# Patient Record
Sex: FEMALE | Race: WHITE
[De-identification: names, ages, dates, MRNs, and addresses within clinical notes are randomized per-mention and may not be internally consistent; named-entity substitution may affect disease eponyms.]

---

## 2020-06-17 ENCOUNTER — HOSPITAL ENCOUNTER (EMERGENCY)
Dept: HOSPITAL 88 - ER | Age: 80
Discharge: HOME | End: 2020-06-17
Payer: MEDICARE

## 2020-06-17 VITALS — HEIGHT: 64 IN | WEIGHT: 192 LBS | BODY MASS INDEX: 32.78 KG/M2

## 2020-06-17 DIAGNOSIS — S52.612A: ICD-10-CM

## 2020-06-17 DIAGNOSIS — I10: ICD-10-CM

## 2020-06-17 DIAGNOSIS — Z85.3: ICD-10-CM
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
## 2020-06-17 DIAGNOSIS — E11.9: ICD-10-CM

## 2020-06-17 DIAGNOSIS — I48.91: ICD-10-CM

## 2020-06-17 DIAGNOSIS — S52.502A: Primary | ICD-10-CM

## 2020-06-17 PROCEDURE — 99282 EMERGENCY DEPT VISIT SF MDM: CPT

## 2020-06-17 NOTE — EMERGENCY DEPARTMENT NOTE
History of Present Illnes


History of Present Illness


Chief Complaint:  Extremity Trauma/Pain


History of Present Illness


This is a 80 year old  female HERE FOR TRIP AND FALL LAST NIGHT ~11PM. 

FELL ONTO HER LEFT ARM. CLIENT REPORTS PAIN 8/10. DENIES DIZZINESS, NAUSEA, 

VOMITING, DIARRHEA, FEVER, COUGH, SOB.


Historian:  Patient


Arrival Mode:  Car


 Required:  No


Onset (how long ago):  hour(s)


Location:  LEFT WRIST


Quality:  MILD PAIN


Radiation:  Reports non-radiation


Severity:  mild


Onset quality:  sudden


Timing of current episode:  constant


Progression:  unchanged


Chronicity:  new


Context:  Denies recent illness


Relieving factors:  none


Exacerbating factors:  none


Associated symptoms:  Reports denies other symptoms, Reports other (NO LOC, NO 

OTHER INJURY)


Treatments prior to arrival:  none





Past Medical/Family History


Physician Review


I have reviewed the patient's past medical and family history.  Any updates have

been documented here.





Past Medical History


Recent Fever:  No


Clinical Suspicion of Infectio:  No


New/Unexplained Change in Ment:  No


Past Medical History:  Hypertension, Diabetes, A-Fib, Cancer, Osteoarthritis


Other Medical History:  


BREATS CANCER





OSTEOPOROSIS


Past Surgical History:  Back Surgery


Other Surgery:  


BACK SURGERY





LUMPECTOMY





Social History


Smoking Cessation:  Never Smoker


Counseling Performed:  No


Alcohol Use:  None


Any Illegal Drug Use:  No


TB Exposure/Symptoms:  No


Physically hurt or threatened:  No





Family History


Family history of heart diseas:  Yes





Other


Any Pre-Existing Lines (PICC,:  No





Review of Systems


Review of Systems


Constitutional:  Reports no symptoms


EENTM:  Reports no symptoms


Cardiovascular:  Reports no symptoms


Respiratory:  Reports no symptoms


Gastrointestinal:  Reports no symptoms


Genitourinary:  Reports no symptoms


Musculoskeletal:  Reports as per HPI, Reports joint pain, Reports joint swelling


Integumentary:  Reports no symptoms


Neurological:  Reports no symptoms


Psychological:  Reports no symptoms


Endocrine:  Reports no symptoms


Hematological/Lymphatic:  Reports no symptoms





Physical Exam


Related Data


Allergies:  


Coded Allergies:  


     iodine (Verified  Allergy, Severe, 7/16/17)


Triage Vital Signs





Vital Signs








  Date Time  Temp Pulse Resp B/P (MAP) Pulse Ox O2 Delivery O2 Flow Rate FiO2


 


6/17/20 09:04 97.6 74 16 104/58 98   








Vital signs reviewed:  Yes





Physical Exam


CONSTITUTIONAL





Constitutional:  Present well-developed, Present well-nourished


HENT


HENT:  Present normocephalic, Present atraumatic, Present oropharynx clear/moist

, Present nose normal


HENT L/R:  Present left ext ear normal, Present right ext ear normal


EYES





Eyes:  Reports PERRL, Reports conjunctivae normal


NECK


Neck:  Present ROM normal


PULMONARY


Pulmonary:  Present effort normal, Present breath sounds normal


CARDIOVASCULAR





Cardiovascular:  Present regular rhythm, Present heart sounds normal, Present 

capillary refill normal, Present normal rate


GASTROINTESTINAL





Abdominal:  Present soft, Present nontender, Present bowel sounds normal


GENITOURINARY





Genitourinary:  Present exam deferred


SKIN


Skin:  Present warm, Present dry, Present other (SMALL 1.5 CM ABRASION TO LEFT 

KNEE, SKIN TEAR TO LEFT FOREARM)


MUSCULOSKELETAL





Musculoskeletal:  Present deformity (LEFT WRIST), Present tenderness (MILD LEFT 

WRIST), Present swelling (LEFT WRIST)


NEUROLOGICAL





Neurological:  Present alert, Present oriented x 3, Present no gross motor or 

sensory deficits


PSYCHOLOGICAL


Psychological:  Present mood/affect normal, Present judgement normal





Results


Imaging


Imaging results reviewed:  Yes


Impressions


Procedure: [id number] DX/WRIST COMPLETE LEFT


Exam Date:                             Exam Time: 








                              REPORT STATUS: Signed





TECHNIQUE: 4 views of the left wrist





HISTORY: 


^fall.





COMPARISON: None.





IMPRESSION:


Acute fracture of the distal radius with approximately 1 cm of fracture


fragment impaction. Scapholunate widening measuring approximately 0.5 cm.


Possible small avulsion and fracture at the styloid tip. Overlying soft tissue


swelling.





Signed by: Leoncio Shell MD on 6/17/2020 9:51 AM





Assessment & Plan


Medical Decision Making


MDM


CHECK LEFT WRIST XRAY R/O FX





Reassessment


Reassessment


WRIST SPLINT PLACED ON LEFT WRIST, TYLENOL/MOTRIN, TYL #3 (#12), F/U dR [place]





Assessment & Plan


Final Impression:  


(1) Radial fracture


(2) Fracture of ulnar styloid


Depart Disposition:  HOME, SELF-CARE


Last Vital Signs











  Date Time  Temp Pulse Resp B/P (MAP) Pulse Ox O2 Delivery O2 Flow Rate FiO2


 


6/17/20 09:04 97.6 74 16 104/58 98   

















KENYON GAMING MD               Jun 17, 2020 09:48

## 2020-06-17 NOTE — DIAGNOSTIC IMAGING REPORT
TECHNIQUE: 4 views of the left wrist



HISTORY: 

^fall.



COMPARISON: None.



IMPRESSION:

Acute fracture of the distal radius with approximately 1 cm of fracture

fragment impaction. Scapholunate widening measuring approximately 0.5 cm.

Possible small avulsion and fracture at the styloid tip. Overlying soft tissue

swelling.



Signed by: Leoncio Shell MD on 6/17/2020 9:51 AM

## 2020-08-25 ENCOUNTER — HOSPITAL ENCOUNTER (OUTPATIENT)
Dept: HOSPITAL 88 - OT | Age: 80
LOS: 6 days | End: 2020-08-31
Attending: ORTHOPAEDIC SURGERY
Payer: MEDICARE

## 2020-08-25 DIAGNOSIS — S52.572D: Primary | ICD-10-CM
